# Patient Record
Sex: FEMALE | Race: WHITE | NOT HISPANIC OR LATINO | Employment: OTHER | URBAN - METROPOLITAN AREA
[De-identification: names, ages, dates, MRNs, and addresses within clinical notes are randomized per-mention and may not be internally consistent; named-entity substitution may affect disease eponyms.]

---

## 2019-12-09 ENCOUNTER — HOSPITAL ENCOUNTER (OUTPATIENT)
Dept: RADIOLOGY | Facility: CLINIC | Age: 59
Discharge: HOME | End: 2019-12-09
Attending: UROLOGY
Payer: COMMERCIAL

## 2019-12-09 ENCOUNTER — OFFICE VISIT (OUTPATIENT)
Dept: UROLOGY | Facility: CLINIC | Age: 59
End: 2019-12-09
Payer: COMMERCIAL

## 2019-12-09 ENCOUNTER — CLINICAL SUPPORT (OUTPATIENT)
Dept: UROLOGY | Facility: CLINIC | Age: 59
End: 2019-12-09
Payer: COMMERCIAL

## 2019-12-09 VITALS
DIASTOLIC BLOOD PRESSURE: 68 MMHG | HEART RATE: 66 BPM | BODY MASS INDEX: 30.73 KG/M2 | WEIGHT: 180 LBS | SYSTOLIC BLOOD PRESSURE: 138 MMHG | RESPIRATION RATE: 20 BRPM | HEIGHT: 64 IN

## 2019-12-09 DIAGNOSIS — N20.1 LEFT URETERAL STONE: ICD-10-CM

## 2019-12-09 DIAGNOSIS — N20.0 RENAL STONE: ICD-10-CM

## 2019-12-09 DIAGNOSIS — N20.0 KIDNEY STONES: ICD-10-CM

## 2019-12-09 DIAGNOSIS — N20.0 KIDNEY STONES: Primary | ICD-10-CM

## 2019-12-09 DIAGNOSIS — N20.1 LEFT URETERAL STONE: Primary | ICD-10-CM

## 2019-12-09 PROCEDURE — 99204 PR OFFICE/OUTPT VISIT, NEW, LEVL IV, 45-59 MIN: ICD-10-PCS | Mod: S$GLB,,, | Performed by: UROLOGY

## 2019-12-09 PROCEDURE — 74018 XR ABDOMEN AP 1 VIEW: ICD-10-PCS | Mod: 26,,, | Performed by: RADIOLOGY

## 2019-12-09 PROCEDURE — 3008F PR BODY MASS INDEX (BMI) DOCUMENTED: ICD-10-PCS | Mod: CPTII,S$GLB,, | Performed by: UROLOGY

## 2019-12-09 PROCEDURE — 3008F BODY MASS INDEX DOCD: CPT | Mod: CPTII,S$GLB,, | Performed by: UROLOGY

## 2019-12-09 PROCEDURE — 99204 OFFICE O/P NEW MOD 45 MIN: CPT | Mod: S$GLB,,, | Performed by: UROLOGY

## 2019-12-09 PROCEDURE — 74018 RADEX ABDOMEN 1 VIEW: CPT | Mod: 26,,, | Performed by: RADIOLOGY

## 2019-12-09 RX ORDER — ESCITALOPRAM OXALATE 10 MG/1
10 TABLET ORAL DAILY
Refills: 3 | COMMUNITY
Start: 2019-11-18

## 2019-12-09 RX ORDER — ESTROGENS, CONJUGATED 0.62 MG/1
TABLET, FILM COATED ORAL
Refills: 3 | COMMUNITY
Start: 2019-11-18

## 2019-12-09 RX ORDER — MEMANTINE HYDROCHLORIDE 10 MG/1
10 TABLET ORAL 2 TIMES DAILY
Refills: 3 | COMMUNITY
Start: 2019-11-19

## 2019-12-09 RX ORDER — QUETIAPINE FUMARATE 25 MG/1
TABLET, FILM COATED ORAL
Refills: 1 | COMMUNITY
Start: 2019-11-25

## 2019-12-09 RX ORDER — RISEDRONATE SODIUM 150 MG/1
TABLET, FILM COATED ORAL
Refills: 6 | COMMUNITY
Start: 2019-11-20

## 2019-12-09 RX ORDER — DONEPEZIL HYDROCHLORIDE 10 MG/1
10 TABLET, FILM COATED ORAL EVERY MORNING
Refills: 3 | COMMUNITY
Start: 2019-11-18

## 2019-12-09 NOTE — ASSESSMENT & PLAN NOTE
1.2 cm left UPJ stone associated with moderate hydronephrosis the a CT scan 11/27/2019.  KUB today stone not visualized, overlying stool and bowel gas obstructs view.  Schedule cystoscopy, bilateral retrograde pyelogram, trans ureteral stone resection, bilateral double-J stent deploy.

## 2019-12-09 NOTE — ASSESSMENT & PLAN NOTE
1.4 cm right renal pelvis stone associated with mild hydronephrosis, 3 mm nonobstructing mid pole calyceal stone left kidney.  No renal masses or perinephric fluid collections are identified, CT scan 11/27/2019.  See assessment above left ureteral stone, staged procedure is planned taking care of the left ureteral stone 1st with plans to bring patient back a week to 2 weeks later for intervention on the right renal stone.  All discussed with  by Dr. Wang, questions answered to patient's satisfaction, all in agreement with plan of care

## 2019-12-09 NOTE — PROGRESS NOTES
Subjective:       Patient ID: Birgit Renteria is a 59 y.o. female.    Chief Complaint: Other (New pt . to est for kidney stones)      HPI: 59-year-old  female presents with severe Alzheimer's disease, presents today for evaluation of bilateral stone disease.  November 27, 2019 patient underwent CT scan noncontrast for complaints of abdominal pain with a known history of kidney stones.  She has identified with a 1.2 cm left UPJ stone with moderate hydronephrosis and a 1.4 cm right renal pelvis stone associated with mild hydronephrosis.  No renal mass or perinephric fluid collections were identified as read by radiology.  Patient presents today with her  and he reports she has been afebrile, voiding without difficulty, no gross hematuria and no recent complaints of pain.      Past Medical History:   Past Medical History:   Diagnosis Date    Alzheimer's dementia     Hiatal hernia        Past Surgical Historical:   Past Surgical History:   Procedure Laterality Date    OOPHORECTOMY          Medications:   Medication List with Changes/Refills   Current Medications    DONEPEZIL (ARICEPT) 10 MG TABLET    Take 10 mg by mouth every morning.    ESCITALOPRAM OXALATE (LEXAPRO) 10 MG TABLET    Take 10 mg by mouth once daily.    MEMANTINE (NAMENDA) 10 MG TAB    Take 10 mg by mouth 2 (two) times daily.    PREMARIN 0.625 MG TABLET    TAKE 1 TABLET (0.625 MG) BY MOUTH ONCE DAILY    QUETIAPINE (SEROQUEL) 25 MG TAB    TAKE 1 2 TO 1 (ONE HALF TO ONE) TABLET BY MOUTH 45 MINUTES PRIOR TO BEDTIME ROUTINES EVERY NIGHT    RISEDRONATE (ACTONEL) 150 MG TAB    TAKE 1 TABLET BY MOUTH ONCE EVERY MONTH ON THE SAME DATE        Past Social History:   Social History     Socioeconomic History    Marital status:      Spouse name: Not on file    Number of children: Not on file    Years of education: Not on file    Highest education level: Not on file   Occupational History    Not on file   Social Needs    Financial  resource strain: Not on file    Food insecurity:     Worry: Not on file     Inability: Not on file    Transportation needs:     Medical: Not on file     Non-medical: Not on file   Tobacco Use    Smoking status: Never Smoker    Smokeless tobacco: Never Used   Substance and Sexual Activity    Alcohol use: Not Currently    Drug use: Not on file    Sexual activity: Not on file   Lifestyle    Physical activity:     Days per week: Not on file     Minutes per session: Not on file    Stress: Not on file   Relationships    Social connections:     Talks on phone: Not on file     Gets together: Not on file     Attends Jehovah's witness service: Not on file     Active member of club or organization: Not on file     Attends meetings of clubs or organizations: Not on file     Relationship status: Not on file   Other Topics Concern    Not on file   Social History Narrative    Not on file       Allergies:   Review of patient's allergies indicates:   Allergen Reactions    Sulfa (sulfonamide antibiotics)         Family History: History reviewed. No pertinent family history.     Review of Systems:  Review of Systems   Constitutional: Negative for activity change and appetite change.   HENT: Negative for congestion and dental problem.    Respiratory: Negative for chest tightness and shortness of breath.    Cardiovascular: Negative for chest pain.   Gastrointestinal: Negative for abdominal distention and abdominal pain.   Genitourinary: Negative for decreased urine volume, difficulty urinating, dyspareunia, dysuria, enuresis, flank pain, frequency, genital sores, hematuria, pelvic pain and urgency.   Musculoskeletal: Negative for back pain and neck pain.   Allergic/Immunologic: Negative for immunocompromised state.   Neurological: Negative for dizziness.   Hematological: Negative for adenopathy.   Psychiatric/Behavioral: Negative for agitation, behavioral problems and confusion.        Patient has severe Alzheimer's disease, is  unable to care for self and or contribute to her own healthcare.   present today       Physical Exam:  Physical Exam   Constitutional: She is oriented to person, place, and time. She appears well-developed and well-nourished.   HENT:   Head: Normocephalic and atraumatic.   Eyes: No scleral icterus.   Neck: Normal range of motion.   Cardiovascular: Intact distal pulses.    Pulmonary/Chest: Effort normal and breath sounds normal.   Abdominal: Soft. She exhibits no distension. There is no tenderness.   Genitourinary: Rectum normal and vagina normal. Pelvic exam was performed with patient supine. There is no rash, tenderness or lesion on the right labia. There is no rash, tenderness or lesion on the left labia.   Musculoskeletal: She exhibits no edema.   Neurological: She is alert and oriented to person, place, and time.   Skin: Skin is warm and dry.     Psychiatric: She has a normal mood and affect.       Assessment/Plan:       Problem List Items Addressed This Visit        Renal/    Left ureteral stone    Current Assessment & Plan     1.2 cm left UPJ stone associated with moderate hydronephrosis the a CT scan 11/27/2019.  KUB today stone not visualized, overlying stool and bowel gas obstructs view.  Schedule cystoscopy, bilateral retrograde pyelogram, trans ureteral stone resection, bilateral double-J stent deploy.         Renal stone    Current Assessment & Plan     1.4 cm right renal pelvis stone associated with mild hydronephrosis, 3 mm nonobstructing mid pole calyceal stone left kidney.  No renal masses or perinephric fluid collections are identified, CT scan 11/27/2019.  See assessment above left ureteral stone, staged procedure is planned taking care of the left ureteral stone 1st with plans to bring patient back a week to 2 weeks later for intervention on the right renal stone.  All discussed with  by Dr. Wang, questions answered to patient's satisfaction, all in agreement with plan of  care           Other Visit Diagnoses     Kidney stones    -  Primary    Relevant Orders    X-Ray Abdomen AP 1 View

## 2019-12-10 DIAGNOSIS — N20.0 BILATERAL KIDNEY STONES: Primary | ICD-10-CM

## 2019-12-10 LAB
APPEARANCE, UA: ABNORMAL
BACTERIA SPEC CULT: ABNORMAL /HPF
BASOPHILS NFR SNV MANUAL: 0.6 % (ref 0–3)
BILIRUB UR QL STRIP: NEGATIVE MG/DL
BUN SERPL-MCNC: 13 MG/DL (ref 7–18)
BUN/CREAT SERPL: 12.62 RATIO (ref 7–18)
CALCIUM SERPL-MCNC: 9 MG/DL (ref 8.8–10.5)
CHLORIDE SERPL-SCNC: 107 MMOL/L (ref 100–108)
CO2 SERPL-SCNC: 31 MMOL/L (ref 21–32)
COLOR UR: ABNORMAL
CREAT SERPL-MCNC: 1.03 MG/DL (ref 0.55–1.02)
EOSINOPHIL NFR SNV MANUAL: 4.2 % (ref 1–3)
ERYTHROCYTE [DISTWIDTH] IN BLOOD BY AUTOMATED COUNT: 12.3 % (ref 12.5–18)
GFR ESTIMATION: 55
GLUCOSE (UA): NORMAL MG/DL
GLUCOSE SERPL-MCNC: 102 MG/DL (ref 70–110)
HCT VFR BLD AUTO: 44.1 % (ref 37–47)
HGB BLD-MCNC: 14.1 G/DL (ref 12–16)
HGB UR QL STRIP: 150 /UL
KETONES UR QL STRIP: NEGATIVE MG/DL
LEUKOCYTE ESTERASE UR QL STRIP: 500 /UL
LYMPHOCYTES NFR SNV MANUAL: 19.5 % (ref 25–40)
MANUAL NRBC PER 100 CELLS: 0 %
MCH RBC QN AUTO: 30.9 PG (ref 27–31.2)
MCHC RBC AUTO-ENTMCNC: 32 G/DL (ref 31.8–35.4)
MCV RBC AUTO: 96.5 FL (ref 80–97)
MONOCYTES/100 LEUKOCYTES: 10.1 % (ref 1–15)
NEUTROPHILS NFR BLD: 5.16 10*3/UL (ref 1.8–7.7)
NEUTROPHILS NFR SNV MANUAL: 65.3 % (ref 37–80)
NITRITE UR QL STRIP: POSITIVE
PH UR STRIP: 6 PH (ref 5–9)
PLATELETS: 253 10*3/UL (ref 142–424)
POTASSIUM SERPL-SCNC: 4.1 MMOL/L (ref 3.6–5.2)
PROT UR QL STRIP: 100 MG/DL
RBC # BLD AUTO: 4.57 10*6/UL (ref 4.2–5.4)
RBC #/AREA URNS HPF: ABNORMAL /HPF (ref 0–2)
SERVICE COMMENT 03: ABNORMAL
SODIUM BLD-SCNC: 143 MMOL/L (ref 135–145)
SP GR UR STRIP: 1.02 (ref 1–1.03)
SPECIMEN COLLECTION METHOD, URINE: ABNORMAL
SQUAMOUS EPITHELIAL, UA: ABNORMAL /LPF
UROBILINOGEN UR STRIP-ACNC: NORMAL MG/DL
WBC # BLD: 7.9 10*3/UL (ref 4.6–10.2)
WBC #/AREA URNS HPF: ABNORMAL /HPF (ref 0–5)

## 2019-12-11 ENCOUNTER — OUTSIDE PLACE OF SERVICE (OUTPATIENT)
Dept: UROLOGY | Facility: CLINIC | Age: 59
End: 2019-12-11
Payer: COMMERCIAL

## 2019-12-11 PROCEDURE — 52332 CYSTOSCOPY AND TREATMENT: CPT | Mod: 59,RT,, | Performed by: UROLOGY

## 2019-12-11 PROCEDURE — 74420 UROGRAPHY RTRGR +-KUB: CPT | Mod: 26,,, | Performed by: UROLOGY

## 2019-12-11 PROCEDURE — 52330 PR CYSTOSCOPY,MANIPULATN: ICD-10-PCS | Mod: 59,RT,, | Performed by: UROLOGY

## 2019-12-11 PROCEDURE — 74420 PR  X-RAY RETROGRADE PYELOGRAM: ICD-10-PCS | Mod: 26,,, | Performed by: UROLOGY

## 2019-12-11 PROCEDURE — 52356 CYSTO/URETERO W/LITHOTRIPSY: CPT | Mod: LT,,, | Performed by: UROLOGY

## 2019-12-11 PROCEDURE — 52332 PR CYSTOSCOPY,INSERT URETERAL STENT: ICD-10-PCS | Mod: 59,RT,, | Performed by: UROLOGY

## 2019-12-11 PROCEDURE — 52330 CYSTOSCOPY AND TREATMENT: CPT | Mod: 59,RT,, | Performed by: UROLOGY

## 2019-12-11 PROCEDURE — 52356 PR CYSTO/URETERO W/LITHOTRIPSY: ICD-10-PCS | Mod: LT,,, | Performed by: UROLOGY

## 2019-12-15 LAB
CALCULI COMPOSITION: NORMAL
CALCULI DESCRIPTION: NORMAL
CALCULI MASS: 28 MG
CALCULI NUMBER: 4
CALCULI PDF: NORMAL
CALCULI SIZE: NORMAL MM

## 2019-12-18 ENCOUNTER — CLINICAL SUPPORT (OUTPATIENT)
Dept: UROLOGY | Facility: CLINIC | Age: 59
End: 2019-12-18
Payer: COMMERCIAL

## 2019-12-18 ENCOUNTER — OFFICE VISIT (OUTPATIENT)
Dept: UROLOGY | Facility: CLINIC | Age: 59
End: 2019-12-18
Payer: COMMERCIAL

## 2019-12-18 ENCOUNTER — HOSPITAL ENCOUNTER (OUTPATIENT)
Dept: RADIOLOGY | Facility: CLINIC | Age: 59
Discharge: HOME | End: 2019-12-18
Attending: UROLOGY
Payer: COMMERCIAL

## 2019-12-18 DIAGNOSIS — N20.0 KIDNEY STONES: ICD-10-CM

## 2019-12-18 DIAGNOSIS — N20.0 RENAL STONE: Primary | ICD-10-CM

## 2019-12-18 DIAGNOSIS — N20.0 RENAL STONE: ICD-10-CM

## 2019-12-18 PROCEDURE — 99213 PR OFFICE/OUTPT VISIT, EST, LEVL III, 20-29 MIN: ICD-10-PCS | Mod: S$GLB,,, | Performed by: UROLOGY

## 2019-12-18 PROCEDURE — 74018 XR ABDOMEN AP 1 VIEW: ICD-10-PCS | Mod: 26,,, | Performed by: RADIOLOGY

## 2019-12-18 PROCEDURE — 74018 RADEX ABDOMEN 1 VIEW: CPT | Mod: 26,,, | Performed by: RADIOLOGY

## 2019-12-18 PROCEDURE — 99213 OFFICE O/P EST LOW 20 MIN: CPT | Mod: S$GLB,,, | Performed by: UROLOGY

## 2019-12-18 NOTE — PROGRESS NOTES
Subjective:       Patient ID: Birgit Renteria is a 59 y.o. female.    Chief Complaint: Post-op Evaluation (F/U CYSTO, LITHO, STONE EXTRA., STENT PLACEMENT) and Other (unable to collect urine specimen.)      HPI: 58 yo fu sp left tusm with stent and right stent.  Doing ok      Past Medical History:   Past Medical History:   Diagnosis Date    Alzheimer's dementia     Hiatal hernia        Past Surgical Historical:   Past Surgical History:   Procedure Laterality Date    CYSTOSCOPY WITH CALCULUS EXTRACTION  12/11/2019    CYSTO, B RTG, L URETEROSCOPY, LITHO, STONE FRAG EXTRACTION, STENT PLACEMENT, R STONE MANIP., STENT PLACEMENT.    OOPHORECTOMY          Medications:   Medication List with Changes/Refills   Current Medications    DONEPEZIL (ARICEPT) 10 MG TABLET    Take 10 mg by mouth every morning.    ESCITALOPRAM OXALATE (LEXAPRO) 10 MG TABLET    Take 10 mg by mouth once daily.    MEMANTINE (NAMENDA) 10 MG TAB    Take 10 mg by mouth 2 (two) times daily.    PREMARIN 0.625 MG TABLET    TAKE 1 TABLET (0.625 MG) BY MOUTH ONCE DAILY    QUETIAPINE (SEROQUEL) 25 MG TAB    TAKE 1 2 TO 1 (ONE HALF TO ONE) TABLET BY MOUTH 45 MINUTES PRIOR TO BEDTIME ROUTINES EVERY NIGHT    RISEDRONATE (ACTONEL) 150 MG TAB    TAKE 1 TABLET BY MOUTH ONCE EVERY MONTH ON THE SAME DATE        Past Social History:   Social History     Socioeconomic History    Marital status:      Spouse name: Not on file    Number of children: Not on file    Years of education: Not on file    Highest education level: Not on file   Occupational History    Not on file   Social Needs    Financial resource strain: Not on file    Food insecurity:     Worry: Not on file     Inability: Not on file    Transportation needs:     Medical: Not on file     Non-medical: Not on file   Tobacco Use    Smoking status: Never Smoker    Smokeless tobacco: Never Used   Substance and Sexual Activity    Alcohol use: Not Currently    Drug use: Not on file    Sexual  activity: Not on file   Lifestyle    Physical activity:     Days per week: Not on file     Minutes per session: Not on file    Stress: Not on file   Relationships    Social connections:     Talks on phone: Not on file     Gets together: Not on file     Attends Mandaeism service: Not on file     Active member of club or organization: Not on file     Attends meetings of clubs or organizations: Not on file     Relationship status: Not on file   Other Topics Concern    Not on file   Social History Narrative    Not on file       Allergies:   Review of patient's allergies indicates:   Allergen Reactions    Sulfa (sulfonamide antibiotics)         Family History: History reviewed. No pertinent family history.     Review of Systems:  Review of Systems   Constitutional: Negative for activity change and appetite change.   HENT: Negative for congestion and dental problem.    Respiratory: Negative for chest tightness and shortness of breath.    Cardiovascular: Negative for chest pain.   Gastrointestinal: Negative for abdominal distention.   Genitourinary: Negative for decreased urine volume, difficulty urinating, dyspareunia, dysuria, enuresis, flank pain, frequency, genital sores, hematuria, pelvic pain and urgency.   Musculoskeletal: Negative for back pain and neck pain.   Neurological: Negative for dizziness.   Hematological: Negative for adenopathy.   Psychiatric/Behavioral: Negative for agitation, behavioral problems and confusion.       Physical Exam:  Physical Exam   Nursing note and vitals reviewed.  Constitutional: She is oriented to person, place, and time. She appears well-developed and well-nourished.   HENT:   Head: Normocephalic.   Cardiovascular: Normal rate, regular rhythm and normal heart sounds.    Pulmonary/Chest: Effort normal and breath sounds normal.   Abdominal: Soft. Bowel sounds are normal.   Neurological: She is alert and oriented to person, place, and time.   Skin: Skin is warm and dry.     kub  left stone is absent--right stone well seen both stents inplace    Assessment/Plan:     will plan right tusm Monday, left stent dc  Problem List Items Addressed This Visit        Renal/    Renal stone - Primary    Relevant Orders    X-Ray Abdomen AP 1 View

## 2019-12-18 NOTE — PROGRESS NOTES
Patient educated, consents signed. Per Dr Wang, no pre-admission lab needed due to recent procedure. Patient verbalized understanding.

## 2019-12-26 LAB
CALCULI COMPOSITION: NORMAL
CALCULI DESCRIPTION: NORMAL
CALCULI MASS: 46 MG
CALCULI NUMBER: NORMAL
CALCULI PDF: NORMAL
CALCULI SIZE: NORMAL MM